# Patient Record
Sex: MALE | Race: WHITE | NOT HISPANIC OR LATINO | Employment: PART TIME | URBAN - METROPOLITAN AREA
[De-identification: names, ages, dates, MRNs, and addresses within clinical notes are randomized per-mention and may not be internally consistent; named-entity substitution may affect disease eponyms.]

---

## 2025-01-14 ENCOUNTER — APPOINTMENT (OUTPATIENT)
Dept: ORTHOPEDIC SURGERY | Facility: CLINIC | Age: 45
End: 2025-01-14

## 2025-01-15 NOTE — PROGRESS NOTES
No chief complaint on file.    History of Present Illness:  Abdiel Herr is a 44 y.o. male presenting to clinic as a new patient  for his left  knee. He has seen Levi Kennedy for his knee and had an aspiration on 12/18/24.    Imaging:  X-rays left  knee: ***     Assessment:   ***    Plan:  We reviewed the role of imaging, physical therapy, injections and the time frame to healing and correlation with outcome.  ***  NSAID: *** consistently for one week then as needed. GI side effects and medical risks discussed.  Ice: 60 minutes on and off  Exercise home program: Medically directed {joint:00508} therapy / Handout given.   Follow-up in ***.      Physical Exam:  Left Knee:  ROM: ***  Positive Nancy´s test/PositiveApley Grind  Neurovascular exam normal distally  Palpable pedal pulse and good cap refill      Review of Systems:  GENERAL: Negative for malaise, significant weight loss, fever  MUSCULOSKELETAL: See HPI  NEURO:  Negative     Past Medical History:   Diagnosis Date    Chlamydial infection, unspecified     Chlamydia    Personal history of other infectious and parasitic diseases     History of herpes zoster    Personal history of other infectious and parasitic diseases     History of gonorrhea    Personal history of other infectious and parasitic diseases 03/14/2014    History of herpes simplex infection    Strain of unspecified achilles tendon, initial encounter     Ruptured, tendon, Achilles    Unspecified tear of unspecified meniscus, current injury, unspecified knee, initial encounter     Torn meniscus       Medication Documentation Review Audit    **Prior to Admission medications have not yet been reviewed**         Not on File    Social History     Socioeconomic History    Marital status: Single     Spouse name: Not on file    Number of children: Not on file    Years of education: Not on file    Highest education level: Not on file   Occupational History    Not on file   Tobacco Use    Smoking  status: Not on file    Smokeless tobacco: Not on file   Substance and Sexual Activity    Alcohol use: Not on file    Drug use: Not on file    Sexual activity: Not on file   Other Topics Concern    Not on file   Social History Narrative    Not on file     Social Drivers of Health     Financial Resource Strain: High Risk (12/16/2023)    Received from [facility]    Overall Financial Resource Strain (CARDIA)     Difficulty of Paying Living Expenses: Very hard   Food Insecurity: Not on File (9/26/2024)    Received from [facility]    Food Insecurity     Food: 0   Transportation Needs: Unmet Transportation Needs (12/16/2023)    Received from [facility]    PRAPARE - Transportation     Lack of Transportation (Medical): Yes     Lack of Transportation (Non-Medical): Yes   Physical Activity: Insufficiently Active (12/16/2023)    Received from [facility]    Exercise Vital Sign     Days of Exercise per Week: 3 days     Minutes of Exercise per Session: 10 min   Stress: Stress Concern Present (12/16/2023)    Received from [facility]    [employer] - Occupational Stress Questionnaire     Feeling of Stress : Very much   Social Connections: Not on File (9/16/2024)    Received from [facility]    Social Connections     Connectedness: 0   Intimate Partner Violence: Not At Risk (12/16/2023)    Received from [facility]    Humiliation, Afraid, Rape, and Kick questionnaire     Fear of Current or Ex-Partner: No     Emotionally Abused: No     Physically Abused: No     Sexually Abused: No   Housing Stability: High Risk (12/16/2023)    Received from [facility]    Housing Stability Vital Sign     Unable to Pay for Housing in the Last Year: Yes     Number of Places Lived in the Last Year: 3     Unstable Housing in the Last Year: Yes       Past Surgical History:   Procedure Laterality Date    KNEE ARTHROSCOPY W/ DEBRIDEMENT  03/17/2014    Arthroscopy Knee Left       No results found.       Scribe Attestation  By signing my  name below, ILily Scribe   attest that this documentation has been prepared under the direction and in the presence of Koby Bowers MD.

## 2025-01-16 ENCOUNTER — APPOINTMENT (OUTPATIENT)
Dept: ORTHOPEDIC SURGERY | Facility: CLINIC | Age: 45
End: 2025-01-16
Payer: COMMERCIAL

## 2025-01-20 ENCOUNTER — TELEPHONE (OUTPATIENT)
Dept: IMMUNOLOGY | Facility: CLINIC | Age: 45
End: 2025-01-20
Payer: COMMERCIAL

## 2025-01-20 NOTE — TELEPHONE ENCOUNTER
from [facility] calls to request that Pt be transferred in [facility]. Pt not seen in [facility] in a few years - not sure that Pt in care elsewhere since [facility]. [facility] calls Pt to check in - will transfer him in [facility] with his permission, or if they don't hear from him within seven days.

## 2025-02-11 NOTE — PROGRESS NOTES
No chief complaint on file.    History of Present Illness:  Abdiel Herr is a 44 y.o. male presenting to clinic as a new patient  for his left  knee. He has seen Levi Kennedy for his knee and had an aspiration on 12/18/24.    Imaging:  X-rays left  knee: ***     Assessment:   ***    Plan:  We reviewed the role of imaging, physical therapy, injections and the time frame to healing and correlation with outcome.  ***  NSAID: *** consistently for one week then as needed. GI side effects and medical risks discussed.  Ice: 60 minutes on and off  Exercise home program: Medically directed {joint:03482} therapy / Handout given.   Follow-up in ***.      Physical Exam:  Left Knee:  ROM: ***  Positive Nancy´s test/PositiveApley Grind  Neurovascular exam normal distally  Palpable pedal pulse and good cap refill      Review of Systems:  GENERAL: Negative for malaise, significant weight loss, fever  MUSCULOSKELETAL: See HPI  NEURO:  Negative     Past Medical History:   Diagnosis Date    Chlamydial infection, unspecified     Chlamydia    Personal history of other infectious and parasitic diseases     History of herpes zoster    Personal history of other infectious and parasitic diseases     History of gonorrhea    Personal history of other infectious and parasitic diseases 03/14/2014    History of herpes simplex infection    Strain of unspecified achilles tendon, initial encounter     Ruptured, tendon, Achilles    Unspecified tear of unspecified meniscus, current injury, unspecified knee, initial encounter     Torn meniscus       Medication Documentation Review Audit    **Prior to Admission medications have not yet been reviewed**         Not on File    Social History     Socioeconomic History    Marital status: Single     Spouse name: Not on file    Number of children: Not on file    Years of education: Not on file    Highest education level: Not on file   Occupational History    Not on file   Tobacco Use    Smoking  status: Not on file    Smokeless tobacco: Not on file   Substance and Sexual Activity    Alcohol use: Not on file    Drug use: Not on file    Sexual activity: Not on file   Other Topics Concern    Not on file   Social History Narrative    Not on file     Social Drivers of Health     Financial Resource Strain: High Risk (12/16/2023)    Received from [facility]    Overall Financial Resource Strain (CARDIA)     Difficulty of Paying Living Expenses: Very hard   Food Insecurity: Food Insecurity Present (1/25/2025)    Received from [facility]    Hunger Vital Sign     Worried About Running Out of Food in the Last Year: Never true     Ran Out of Food in the Last Year: Often true   Transportation Needs: Unmet Transportation Needs (1/25/2025)    Received from [facility]    PRAPARE - Transportation     Lack of Transportation (Medical): Yes     Lack of Transportation (Non-Medical): Yes   Physical Activity: Insufficiently Active (12/16/2023)    Received from [facility]    Exercise Vital Sign     Days of Exercise per Week: 3 days     Minutes of Exercise per Session: 10 min   Stress: Stress Concern Present (12/16/2023)    Received from [facility]    [employer] - Occupational Stress Questionnaire     Feeling of Stress : Very much   Social Connections: Not on File (9/16/2024)    Received from [facility]    Social Connections     Connectedness: 0   Intimate Partner Violence: Not At Risk (1/25/2025)    Received from [facility]    Humiliation, Afraid, Rape, and Kick questionnaire     Fear of Current or Ex-Partner: No     Emotionally Abused: No     Physically Abused: No     Sexually Abused: No   Housing Stability: High Risk (12/16/2023)    Received from [facility]    Housing Stability Vital Sign     Unable to Pay for Housing in the Last Year: Yes     Number of Places Lived in the Last Year: 3     Unstable Housing in the Last Year: Yes       Past Surgical History:   Procedure Laterality Date    KNEE  ARTHROSCOPY W/ DEBRIDEMENT  03/17/2014    Arthroscopy Knee Left       No results found.       Scribe Attestation  By signing my name below, I, Asad Skinner   attest that this documentation has been prepared under the direction and in the presence of Koby Bowers MD.

## 2025-02-13 ENCOUNTER — APPOINTMENT (OUTPATIENT)
Dept: ORTHOPEDIC SURGERY | Facility: CLINIC | Age: 45
End: 2025-02-13
Payer: COMMERCIAL